# Patient Record
Sex: MALE | Race: WHITE
[De-identification: names, ages, dates, MRNs, and addresses within clinical notes are randomized per-mention and may not be internally consistent; named-entity substitution may affect disease eponyms.]

---

## 2018-06-08 ENCOUNTER — HOSPITAL ENCOUNTER (EMERGENCY)
Dept: HOSPITAL 89 - ER | Age: 18
Discharge: HOME | End: 2018-06-08
Payer: COMMERCIAL

## 2018-06-08 VITALS — DIASTOLIC BLOOD PRESSURE: 73 MMHG | SYSTOLIC BLOOD PRESSURE: 133 MMHG

## 2018-06-08 DIAGNOSIS — F10.920: Primary | ICD-10-CM

## 2018-06-08 LAB — PLATELET COUNT, AUTOMATED: 189 K/UL (ref 150–450)

## 2018-06-08 PROCEDURE — 82310 ASSAY OF CALCIUM: CPT

## 2018-06-08 PROCEDURE — 82247 BILIRUBIN TOTAL: CPT

## 2018-06-08 PROCEDURE — 84450 TRANSFERASE (AST) (SGOT): CPT

## 2018-06-08 PROCEDURE — 82565 ASSAY OF CREATININE: CPT

## 2018-06-08 PROCEDURE — 99283 EMERGENCY DEPT VISIT LOW MDM: CPT

## 2018-06-08 PROCEDURE — 85025 COMPLETE CBC W/AUTO DIFF WBC: CPT

## 2018-06-08 PROCEDURE — 80320 DRUG SCREEN QUANTALCOHOLS: CPT

## 2018-06-08 PROCEDURE — 84132 ASSAY OF SERUM POTASSIUM: CPT

## 2018-06-08 PROCEDURE — 82374 ASSAY BLOOD CARBON DIOXIDE: CPT

## 2018-06-08 PROCEDURE — 82947 ASSAY GLUCOSE BLOOD QUANT: CPT

## 2018-06-08 PROCEDURE — 84460 ALANINE AMINO (ALT) (SGPT): CPT

## 2018-06-08 PROCEDURE — 84295 ASSAY OF SERUM SODIUM: CPT

## 2018-06-08 PROCEDURE — 80329 ANALGESICS NON-OPIOID 1 OR 2: CPT

## 2018-06-08 PROCEDURE — 82040 ASSAY OF SERUM ALBUMIN: CPT

## 2018-06-08 PROCEDURE — 84520 ASSAY OF UREA NITROGEN: CPT

## 2018-06-08 PROCEDURE — 82435 ASSAY OF BLOOD CHLORIDE: CPT

## 2018-06-08 PROCEDURE — 84155 ASSAY OF PROTEIN SERUM: CPT

## 2018-06-08 PROCEDURE — 83735 ASSAY OF MAGNESIUM: CPT

## 2018-06-08 PROCEDURE — 84075 ASSAY ALKALINE PHOSPHATASE: CPT

## 2018-06-08 PROCEDURE — 96374 THER/PROPH/DIAG INJ IV PUSH: CPT

## 2018-06-08 NOTE — ER REPORT
History and Physical


Time Seen By MD:  06:25


Hx. of Stated Complaint:  


patient states he doesn't know how much he had to drink, patient states just 


keeps vomiting.


 (JANETT MAYNARD DO)


HPI/ROS


CHIEF COMPLAINT: intoxicated





HISTORY OF PRESENT ILLNESS: Pt brought in by friends who found him intoxicated 

and vomiting. Pt is alert. Pt admits to drinking but is unable to tell me how 

much he drank. Pt c/o of severe nausea. Pt denies using any other medication/

drugs today.  Pt denies recent illness.  Pt is able to follow commands. Pt 

states that he has a hx of bipolar but is not on any medications currently. Pt 

denies homicidal or suicidal ideations. Pt brought in by his roommate. Spoke 

with his roommate who states that she was driving someone to [place] and 

when she came home he was drunk and vomiting.  States that she tried to get him 

to drink water but she could not stop vomiting so she gave him a shower and 

brought him into the emergency room. 





REVIEW OF SYSTEMS:


Constitutional: No fever, no chills.


Eyes: No discharge.


ENT: No sore throat. 


Cardiovascular: No chest pain, no palpitations.


Respiratory: No cough, no shortness of breath.


Gastrointestinal: No abdominal pain, + vomiting.


Genitourinary: No hematuria.


Musculoskeletal: No back pain.


Skin: No rashes.


Neurological: No headache.


 (JANETT MAYNARD DO)


Allergies:  


Coded Allergies:  


     No Known Drug Allergies (Verified , 6/8/18)


Home Meds


Discontinued Reported Medications


Albuterol Sulfate (Proair Hfa) 8.5 Gm Aer.w.adap, 8.5 GM IH, 0 Refills


   6/28/11


Montelukast Sodium (Singulair) 4 Mg Chew, 4 MG PO QHS, 0 Refills


   6/28/11


Risperidone (Risperdal) 3 Mg Tablet, 3 MG PO HS, 0 Refills


   6/28/11


Risperidone (Risperdal) 2 Mg Tablet, 2 MG PO DAILY, 0 Refills


   6/28/11


Mometasone Furoate (Nasonex) 17 Gm Spray, 0 NA QDAY, 0 Refills


   SPRAY 2 SPRAYS IN EACH NOSTRIL


   6/28/11


Past Medical/Surgical History


Pmhx: bipolar


Pshx: tonsilectomy


 (JANETT MAYNARD DO)


Reviewed Nurses Notes:  Yes


 (JANETT MAYNARD V DO)


Hx Smoking:  Yes


Smoking Status:  Light Tobacco Smoker


Hx Alcohol Use:  Yes


 (CAESARJANETT V DO)


Constitutional





Vital Sign - Last 24 Hours








 6/8/18 6/8/18 6/8/18 6/8/18





 06:20 06:20 06:30 06:37


 


Temp  96.9  


 


Pulse  77 74 


 


Resp  20  


 


B/P (MAP) 114/80 (91) 114/80 ???/??? (1665) 109/64 (79)


 


Pulse Ox  96 98 


 


O2 Delivery  Room Air  


 


    





 6/8/18 6/8/18 6/8/18 





 06:45 07:00 07:15 


 


Pulse 76 ??? 78 


 


B/P (MAP)  95/63 (74)  


 


Pulse Ox 99 89 98 














Intake and Output   


 


 6/8/18 6/8/18 6/9/18





 14:59 22:59 06:59


 


Intake Total 1000 ml  


 


Balance 1000 ml  





 (LONNY FRANCO MD)


Physical Exam


General Appearance: The patient is alert, has no immediate need for airway 

protection and no signs of toxicity.


Eyes: Pupils equal and round no pallor or injection, EOMI


ENT:  no pharyngeal erythema or exudates, Mucous membranes are moist, TM are nl 

b/l


Respiratory: There are no retractions, lungs are clear to auscultation.


Cardiovascular: Regular rate and rhythm. pulses are equal and symmetrical


Gastrointestinal:  Abdomen is soft and non tender, no masses, bowel sounds 

normal, no guarding, no rigidity or rebound


Neurological: Cranial nerves II-XII grossly intact, no sensory or motor loss


Skin: Warm and dry, no rashes.


Musculoskeletal: Neck is supple non tender, no vertebral tenderness


Extremities are nontender, non swollen and have full range of motion.








DIFFERENTIAL DIAGNOSIS: After history and physical exam differential diagnosis 

was considered for intoxication, overdose, electrolyte abnl


 (CAESAR,JANETT V DO)





Medical Decision Making


Data Points


Result Diagram:  


6/8/18 0636                                                                    

            6/8/18 0636





Laboratory





Hematology








Test


  6/8/18


06:36


 


Red Blood Count


  5.32 M/uL


(4.00-5.60)


 


Mean Corpuscular Volume


  84.9 fL


(80.0-96.0)


 


Mean Corpuscular Hemoglobin


  29.5 pg


(26.0-33.0)


 


Mean Corpuscular Hemoglobin


Concent 34.8 g/dL


(32.0-36.0)


 


Red Cell Distribution Width


  13.3 %


(11.5-14.5)


 


Mean Platelet Volume


  8.9 fL


(7.2-11.1)


 


Neutrophils (%) (Auto)


  75.8 %


(39.4-72.5)


 


Lymphocytes (%) (Auto)


  18.1 %


(17.6-49.6)


 


Monocytes (%) (Auto)


  4.1 %


(4.1-12.4)


 


Eosinophils (%) (Auto)


  1.9 %


(0.4-6.7)


 


Basophils (%) (Auto)


  0.1 %


(0.3-1.4)


 


Nucleated RBC Relative Count


(auto) 0.0 /100WBC 


 


 


Neutrophils # (Auto)


  5.9 K/uL


(2.0-7.4)


 


Lymphocytes # (Auto)


  1.4 K/uL


(1.3-3.6)


 


Monocytes # (Auto)


  0.3 K/uL


(0.3-1.0)


 


Eosinophils # (Auto)


  0.2 K/uL


(0.0-0.5)


 


Basophils # (Auto)


  0.0 K/uL


(0.0-0.1)


 


Nucleated RBC Absolute Count


(auto) 0.00 K/uL 


 


 


Sodium Level


  137 mmol/L


(137-145)


 


Potassium Level


  3.3 mmol/L


(3.5-5.0)


 


Chloride Level


  99 mmol/L


()


 


Carbon Dioxide Level


  23 mmol/L


(22-30)


 


Blood Urea Nitrogen 6 mg/dl (9-21) 


 


Creatinine


  0.80 mg/dl


(0.66-1.25)


 


Glomerular Filtration Rate


Calc > 60.0 


 


 


Random Glucose


  143 mg/dl


()


 


Calcium Level


  8.7 mg/dl


(8.4-10.2)


 


Magnesium Level


  1.9 mg/dl


(1.7-2.2)


 


Total Bilirubin


  0.5 mg/dl


(0.2-1.3)


 


Aspartate Amino Transf


(AST/SGOT) 22 U/L (0-35) 


 


 


Alanine Aminotransferase


(ALT/SGPT) 25 U/L (0-56) 


 


 


Alkaline Phosphatase 67 U/L (0-126) 


 


Total Protein


  7.0 gm/dl


(6.3-8.2)


 


Albumin


  4.1 g/dl


(3.5-5.0)


 


Acetaminophen Level < 10 ug/ml 


 


Serum Alcohol 114 mg/dl 








Chemistry








Test


  6/8/18


06:36


 


White Blood Count


  7.8 k/uL


(4.5-11.0)


 


Red Blood Count


  5.32 M/uL


(4.00-5.60)


 


Hemoglobin


  15.7 g/dL


(14.0-18.0)


 


Hematocrit


  45.1 %


(42.0-52.0)


 


Mean Corpuscular Volume


  84.9 fL


(80.0-96.0)


 


Mean Corpuscular Hemoglobin


  29.5 pg


(26.0-33.0)


 


Mean Corpuscular Hemoglobin


Concent 34.8 g/dL


(32.0-36.0)


 


Red Cell Distribution Width


  13.3 %


(11.5-14.5)


 


Platelet Count


  189 K/uL


(150-450)


 


Mean Platelet Volume


  8.9 fL


(7.2-11.1)


 


Neutrophils (%) (Auto)


  75.8 %


(39.4-72.5)


 


Lymphocytes (%) (Auto)


  18.1 %


(17.6-49.6)


 


Monocytes (%) (Auto)


  4.1 %


(4.1-12.4)


 


Eosinophils (%) (Auto)


  1.9 %


(0.4-6.7)


 


Basophils (%) (Auto)


  0.1 %


(0.3-1.4)


 


Nucleated RBC Relative Count


(auto) 0.0 /100WBC 


 


 


Neutrophils # (Auto)


  5.9 K/uL


(2.0-7.4)


 


Lymphocytes # (Auto)


  1.4 K/uL


(1.3-3.6)


 


Monocytes # (Auto)


  0.3 K/uL


(0.3-1.0)


 


Eosinophils # (Auto)


  0.2 K/uL


(0.0-0.5)


 


Basophils # (Auto)


  0.0 K/uL


(0.0-0.1)


 


Nucleated RBC Absolute Count


(auto) 0.00 K/uL 


 


 


Glomerular Filtration Rate


Calc > 60.0 


 


 


Calcium Level


  8.7 mg/dl


(8.4-10.2)


 


Magnesium Level


  1.9 mg/dl


(1.7-2.2)


 


Total Bilirubin


  0.5 mg/dl


(0.2-1.3)


 


Aspartate Amino Transf


(AST/SGOT) 22 U/L (0-35) 


 


 


Alanine Aminotransferase


(ALT/SGPT) 25 U/L (0-56) 


 


 


Alkaline Phosphatase 67 U/L (0-126) 


 


Total Protein


  7.0 gm/dl


(6.3-8.2)


 


Albumin


  4.1 g/dl


(3.5-5.0)


 


Acetaminophen Level < 10 ug/ml 


 


Serum Alcohol 114 mg/dl 








Toxicology








Test


  6/8/18


06:36


 


Acetaminophen Level < 10 ug/ml 


 


Serum Alcohol 114 mg/dl 





 (LONNY FRANCO MD)





ED Course/Re-evaluation


Clinical Indication for ER IV:  Hydration, IV Access


ED Course


Will start a line to give fluids and antiemetic.  Will check labs. 





 06/08/2018 6:55:28 am Signed out to Dr. Franco


 (JANETT MAYNARD DO)


ED Course


 06/08/2018 8:14:34 am 


This is a 17-year-old male who was brought to the emergency department by his 

roommates for alcohol intoxication. He was given IV fluids and antinausea 

medicine, and was observed in the emergency department until clinical sobriety. 

He is currently walking back-and-forth to the bathroom and talking with his 

weights or at the bedside. He is clinically sober. His roommates plan to drive 

him home and continue to monitor him throughout the day.


Decision to Disposition Date:  Jun 8, 2018


Decision to Disposition Time:  08:16


 (LONNY FRANCO MD)





Depart


Departure


Latest Vital Signs





Vital Signs








  Date Time  Temp Pulse Resp B/P (MAP) Pulse Ox O2 Delivery O2 Flow Rate FiO2


 


6/8/18 07:15  78   98   


 


6/8/18 07:00    95/63 (74)    


 


6/8/18 06:20 96.9  20   Room Air  





 (LONNY FRANCO MD)


Impression:  


 Primary Impression:  


 Alcohol intoxication


Condition:  Improved


Disposition:  HOME OR SELF-CARE


Referrals:  


YAMILETH TOMLIN MD (PCP)


New Scripts


No Active Prescriptions or Reported Meds


Patient Instructions:  Alcohol Intoxication (ED)





Problem Qualifiers








 Primary Impression:  


 Alcohol intoxication


 Complication of substance-induced condition:  uncomplicated  Qualified Codes:  

F10.920 - Alcohol use, unspecified with intoxication, uncomplicated








JANETT MAYNARD DO Jun 8, 2018 06:25


LONNY FRANCO MD Jun 8, 2018 08:18